# Patient Record
Sex: MALE | NOT HISPANIC OR LATINO | Employment: OTHER | URBAN - METROPOLITAN AREA
[De-identification: names, ages, dates, MRNs, and addresses within clinical notes are randomized per-mention and may not be internally consistent; named-entity substitution may affect disease eponyms.]

---

## 2018-12-03 ENCOUNTER — TELEPHONE (OUTPATIENT)
Dept: CARDIOLOGY CLINIC | Facility: CLINIC | Age: 38
End: 2018-12-03

## 2018-12-03 NOTE — TELEPHONE ENCOUNTER
[facility]; LM FOR PT TO CALL BACK  I SCANNED THE AUTH IN EPIC   LET ME KNOW WHAT DOCT PT IS GOING TO SEE SO I CAN GIVE ALL THE CLINICAL TO THE NURSE FOR THE DOCT TO REVIEW

## 2018-12-07 NOTE — TELEPHONE ENCOUNTER
LEFT 3RD MSG FOR PT TO CALL BACK    [facility]ED [facility] & LM ON ORTEGA WILLIAM'S VM LETTING HER KNOW THAT WE TRIED CALLING PT 3X FOR AN APPT & TO CALL US BACK IF SHE NEEDS ANYTHING ELSE FROM US

## 2018-12-27 ENCOUNTER — CONSULT (OUTPATIENT)
Dept: CARDIOLOGY CLINIC | Facility: CLINIC | Age: 38
End: 2018-12-27
Payer: OTHER GOVERNMENT

## 2018-12-27 VITALS
OXYGEN SATURATION: 98 % | HEIGHT: 71 IN | HEART RATE: 87 BPM | SYSTOLIC BLOOD PRESSURE: 128 MMHG | BODY MASS INDEX: 29.68 KG/M2 | WEIGHT: 212 LBS | DIASTOLIC BLOOD PRESSURE: 70 MMHG

## 2018-12-27 DIAGNOSIS — R40.20 LOSS OF CONSCIOUSNESS (HCC): Primary | ICD-10-CM

## 2018-12-27 DIAGNOSIS — R53.81 PHYSICAL DECONDITIONING: ICD-10-CM

## 2018-12-27 DIAGNOSIS — Z72.0 TOBACCO ABUSE: ICD-10-CM

## 2018-12-27 PROBLEM — G89.29 CHRONIC PAIN OF BOTH KNEES: Status: ACTIVE | Noted: 2018-12-27

## 2018-12-27 PROBLEM — M25.562 CHRONIC PAIN OF BOTH KNEES: Status: ACTIVE | Noted: 2018-12-27

## 2018-12-27 PROBLEM — F43.10 PTSD (POST-TRAUMATIC STRESS DISORDER): Status: ACTIVE | Noted: 2018-12-27

## 2018-12-27 PROBLEM — F32.A DEPRESSION: Status: ACTIVE | Noted: 2018-12-27

## 2018-12-27 PROBLEM — M25.561 CHRONIC PAIN OF BOTH KNEES: Status: ACTIVE | Noted: 2018-12-27

## 2018-12-27 PROBLEM — G89.29 CHRONIC LOW BACK PAIN: Status: ACTIVE | Noted: 2018-12-27

## 2018-12-27 PROBLEM — M54.50 CHRONIC LOW BACK PAIN: Status: ACTIVE | Noted: 2018-12-27

## 2018-12-27 PROCEDURE — 99205 OFFICE O/P NEW HI 60 MIN: CPT | Performed by: INTERNAL MEDICINE

## 2018-12-27 RX ORDER — PAROXETINE 30 MG/1
30 TABLET, FILM COATED ORAL EVERY MORNING
COMMUNITY

## 2018-12-27 RX ORDER — ROPINIROLE 0.25 MG/1
0.75 TABLET, FILM COATED ORAL 3 TIMES DAILY
COMMUNITY

## 2018-12-27 RX ORDER — HYDROCODONE BITARTRATE AND ACETAMINOPHEN 5; 325 MG/1; MG/1
1 TABLET ORAL EVERY 6 HOURS PRN
COMMUNITY

## 2018-12-27 NOTE — LETTER
December 27, 2018     Mitali Heredia MD  [address]    Patient: Darian Low   YOB: 1980   Date of Visit: 12/27/2018       Dear Dr Velazquez Bun: Thank you for referring Darian Low to me for evaluation  Below are my notes for this consultation  If you have questions, please do not hesitate to call me  I look forward to following your patient along with you  Sincerely,        Ra Cota DO        CC: No Recipients  Ra Cota DO  12/27/2018  1:19 PM  Sign at close encounter                                             Cardiology Consultation     Darian Low  [id number]  1980  [address] [facility]    Dear Dr Darshan Melgar is a 19-year-old male  who has been referred to the [address] [place] [address] Cardiology in [place] with the following issues:    1  Loss of consciousness  2  Tobacco abuse     Mr Soila Nunez was in his normal state of health while he was out walking his dogs 1 evening  When returning from this walk, he suddenly felt what he describes as "drunk "  He was forced to lean against a tree for balance, then dropped down to 1 knee  At that point he lost consciousness and does not remember anything  His girlfriend, who accompanies him on the visit today, witnessed the entire episode  After he lost consciousness, she rolled him over and he was hissing  It did appear that he stopped breathing she called 911 and began BLS  The patient had another episode of hissing at which point medics arrived  Throughout the entire ordeal, the patient was clenched with high muscle tone  No shaking or jerking movements were observed  He did not lose bowel or bladder control  He denies preceding palpitations or chest pain  He was evaluated in the [facility]  EEG was negative for seizure  Echocardiogram showed normal anatomy and normal function    Holter monitor showed episodes of sinus bradycardia with first-degree AV block, occasionally second-degree type 1 av block (Wenckebach) without evidence of high-grade heart block or malignant arrhythmia  It should be noted, that the patient's paroxetine dose had been increased by 10 mg on the day in question  This was reduced back down to 30 mg  Additionally, his process in was discontinued for concern of orthostatic hypotension  Since the original episode, he reports no further episodes of loss of consciousness  He continues to smoke tobacco, 1/2 pack per day  He is planning on quitting as a new year's resolution  In no palpitations, syncope, presyncope, chest pain, orthopnea or paroxysmal nocturnal dyspnea  He does make note of some dyspnea on exertion  He suffers from severe back pain, requiring ablation of the lumbar nerves as well as significant bilateral knee osteoarthritis  This limits his physical abilities  Past medical history includes:  1  Severe back pain, status post multiple lumbar nerve relations  2  Bilateral severe knee osteoarthritis  3  Posttraumatic stress disorder  4  Depression     Social History     Social History    Marital status: /Civil Union     Spouse name: N/A    Number of children: N/A    Years of education: N/A     Occupational History    Not on file  Social History Main Topics    Smoking status: Current Every Day Smoker    Smokeless tobacco: Current User    Alcohol use Not on file    Drug use: Unknown    Sexual activity: Not on file     Other Topics Concern    Not on file     Social History Narrative    No narrative on file      Family History   Problem Relation Age of Onset    Hypertension Mother     Diabetes Father      History reviewed  No pertinent surgical history      Current Outpatient Prescriptions:     fexofenadine (ALLEGRA) 30 MG tablet, Take 30 mg by mouth 2 (two) times a day, Disp: , Rfl:     HYDROcodone-acetaminophen (NORCO) 5-325 mg per tablet, Take 1 tablet by mouth every 6 (six) hours as needed for pain, Disp: , Rfl:     Melatonin 1 MG CAPS, Take 1 mg by mouth, Disp: , Rfl:     PARoxetine (PAXIL) 30 mg tablet, Take 30 mg by mouth every morning, Disp: , Rfl:     rOPINIRole (REQUIP) 0 25 mg tablet, Take 0 75 mg by mouth 3 (three) times a day, Disp: , Rfl:   No Known Allergies  Vitals:    12/27/18 1125   BP: 128/70   Pulse: 87   SpO2: 98%   Weight: 96 2 kg (212 lb)   Height: 5' 11" (1 803 m)       Labs:  Reviewed  No abnormalities noted  Imaging:  Echocardiogram, 10/03/2018        Event monitor, 11/16/2018      EEG, 10/03/2018        Review of Systems:  Review of Systems   Constitutional: Negative  Respiratory: Negative  Cardiovascular: Negative  Gastrointestinal: Negative  Endocrine: Negative  Musculoskeletal: Positive for arthralgias and back pain  Skin: Negative  Neurological:        Loss of consciousness, not otherwise specified   Hematological: Negative  Psychiatric/Behavioral: Positive for sleep disturbance  The patient is nervous/anxious  Physical Exam:  Physical Exam   Constitutional: He is oriented to person, place, and time  He appears well-developed and well-nourished  HENT:   Head: Normocephalic and atraumatic  Eyes: Conjunctivae and EOM are normal    Neck: No hepatojugular reflux and no JVD present  Carotid bruit is not present  No tracheal deviation present  No thyromegaly present  Cardiovascular: Normal rate, regular rhythm, S1 normal and S2 normal   PMI is not displaced  Exam reveals no gallop  No murmur heard  Pulses:       Carotid pulses are 2+ on the right side, and 2+ on the left side  Radial pulses are 2+ on the right side, and 2+ on the left side  Femoral pulses are 2+ on the right side, and 2+ on the left side  Dorsalis pedis pulses are 2+ on the right side, and 2+ on the left side          Posterior tibial pulses are 2+ on the right side, and 2+ on the left side    Pulmonary/Chest: Breath sounds normal  No accessory muscle usage  No tachypnea  No respiratory distress  Abdominal: Soft  Bowel sounds are normal  He exhibits no distension  There is no tenderness  Musculoskeletal: He exhibits no edema  Lymphadenopathy:        Head (right side): No submental, no submandibular, no tonsillar, no preauricular, no posterior auricular and no occipital adenopathy present  Head (left side): No submental, no submandibular, no tonsillar, no preauricular, no posterior auricular and no occipital adenopathy present  He has no cervical adenopathy  Neurological: He is alert and oriented to person, place, and time  Skin: Skin is warm and dry  Psychiatric: He has a normal mood and affect  His behavior is normal  Judgment and thought content normal        Discussion/Summary:  Loss of consciousness, favor seizure rather than syncope  Tobacco abuse  Deconditioning    Mr Sera Alfred likely suffered a seizure  The scenario he described does not portend a diagnosis of syncope  There were no preceding palpitations, there is no prodrome  When on the ground and unconscious, he maintained high tone with a clenched jaw and was confused after he woke up  Much of this points towards seizure with a postictal state  His cardiovascular testing is negative up to this point  I would continue workup from a neurologic standpoint  He was counseled on tobacco abuse in the benefits of quitting  He is planning on quitting in the new year  This versus deconditioning goes, I advised him to engage in exercise as he is able, given his back and knee pain  He will follow up in this office in 6 months for re-evaluation, sooner if any acute issues arise  Thank you for the opportunity to consult on this patient  If you have any questions, please feel free to call my office

## 2018-12-27 NOTE — PROGRESS NOTES
Cardiology Consultation     Lang Chauhan  [id number]  1980  [place]    Dear Dr Lomeli Child is a 42-year-old male  who has been referred to the [address] [place] [address] Cardiology in [place] with the following issues:    1  Loss of consciousness  2  Tobacco abuse     Mr Comfort Machado was in his normal state of health while he was out walking his dogs 1 evening  When returning from this walk, he suddenly felt what he describes as "drunk "  He was forced to lean against a tree for balance, then dropped down to 1 knee  At that point he lost consciousness and does not remember anything  His girlfriend, who accompanies him on the visit today, witnessed the entire episode  After he lost consciousness, she rolled him over and he was hissing  It did appear that he stopped breathing she called 911 and began BLS  The patient had another episode of hissing at which point medics arrived  Throughout the entire ordeal, the patient was clenched with high muscle tone  No shaking or jerking movements were observed  He did not lose bowel or bladder control  He denies preceding palpitations or chest pain  He was evaluated in the [place] system  EEG was negative for seizure  Echocardiogram showed normal anatomy and normal function  Holter monitor showed episodes of sinus bradycardia with first-degree AV block, occasionally second-degree type 1 av block (Wenckebach) without evidence of high-grade heart block or malignant arrhythmia  It should be noted, that the patient's paroxetine dose had been increased by 10 mg on the day in question  This was reduced back down to 30 mg  Additionally, his process in was discontinued for concern of orthostatic hypotension  Since the original episode, he reports no further episodes of loss of consciousness    He continues to smoke tobacco, 1/2 pack per day  He is planning on quitting as a new year's resolution  In no palpitations, syncope, presyncope, chest pain, orthopnea or paroxysmal nocturnal dyspnea  He does make note of some dyspnea on exertion  He suffers from severe back pain, requiring ablation of the lumbar nerves as well as significant bilateral knee osteoarthritis  This limits his physical abilities  Past medical history includes:  1  Severe back pain, status post multiple lumbar nerve relations  2  Bilateral severe knee osteoarthritis  3  Posttraumatic stress disorder  4  Depression     Social History     Social History    Marital status: /Civil Union     Spouse name: N/A    Number of children: N/A    Years of education: N/A     Occupational History    Not on file  Social History Main Topics    Smoking status: Current Every Day Smoker    Smokeless tobacco: Current User    Alcohol use Not on file    Drug use: Unknown    Sexual activity: Not on file     Other Topics Concern    Not on file     Social History Narrative    No narrative on file      Family History   Problem Relation Age of Onset    Hypertension Mother     Diabetes Father      History reviewed  No pertinent surgical history  Current Outpatient Prescriptions:     fexofenadine (ALLEGRA) 30 MG tablet, Take 30 mg by mouth 2 (two) times a day, Disp: , Rfl:     HYDROcodone-acetaminophen (NORCO) 5-325 mg per tablet, Take 1 tablet by mouth every 6 (six) hours as needed for pain, Disp: , Rfl:     Melatonin 1 MG CAPS, Take 1 mg by mouth, Disp: , Rfl:     PARoxetine (PAXIL) 30 mg tablet, Take 30 mg by mouth every morning, Disp: , Rfl:     rOPINIRole (REQUIP) 0 25 mg tablet, Take 0 75 mg by mouth 3 (three) times a day, Disp: , Rfl:   No Known Allergies  Vitals:    12/27/18 1125   BP: 128/70   Pulse: 87   SpO2: 98%   Weight: 96 2 kg (212 lb)   Height: 5' 11" (1 803 m)       Labs:  Reviewed    No abnormalities noted     Imaging:  Echocardiogram, 10/03/2018        Event monitor, 11/16/2018      EEG, 10/03/2018        Review of Systems:  Review of Systems   Constitutional: Negative  Respiratory: Negative  Cardiovascular: Negative  Gastrointestinal: Negative  Endocrine: Negative  Musculoskeletal: Positive for arthralgias and back pain  Skin: Negative  Neurological:        Loss of consciousness, not otherwise specified   Hematological: Negative  Psychiatric/Behavioral: Positive for sleep disturbance  The patient is nervous/anxious  Physical Exam:  Physical Exam   Constitutional: He is oriented to person, place, and time  He appears well-developed and well-nourished  HENT:   Head: Normocephalic and atraumatic  Eyes: Conjunctivae and EOM are normal    Neck: No hepatojugular reflux and no JVD present  Carotid bruit is not present  No tracheal deviation present  No thyromegaly present  Cardiovascular: Normal rate, regular rhythm, S1 normal and S2 normal   PMI is not displaced  Exam reveals no gallop  No murmur heard  Pulses:       Carotid pulses are 2+ on the right side, and 2+ on the left side  Radial pulses are 2+ on the right side, and 2+ on the left side  Femoral pulses are 2+ on the right side, and 2+ on the left side  Dorsalis pedis pulses are 2+ on the right side, and 2+ on the left side  Posterior tibial pulses are 2+ on the right side, and 2+ on the left side  Pulmonary/Chest: Breath sounds normal  No accessory muscle usage  No tachypnea  No respiratory distress  Abdominal: Soft  Bowel sounds are normal  He exhibits no distension  There is no tenderness  Musculoskeletal: He exhibits no edema  Lymphadenopathy:        Head (right side): No submental, no submandibular, no tonsillar, no preauricular, no posterior auricular and no occipital adenopathy present          Head (left side): No submental, no submandibular, no tonsillar, no preauricular, no posterior auricular and no occipital adenopathy present  He has no cervical adenopathy  Neurological: He is alert and oriented to person, place, and time  Skin: Skin is warm and dry  Psychiatric: He has a normal mood and affect  His behavior is normal  Judgment and thought content normal        Discussion/Summary:  Loss of consciousness, favor seizure rather than syncope  Tobacco abuse  Deconditioning    Mr Jairon Garduno likely suffered a seizure  The scenario he described does not portend a diagnosis of syncope  There were no preceding palpitations, there is no prodrome  When on the ground and unconscious, he maintained high tone with a clenched jaw and was confused after he woke up  Much of this points towards seizure with a postictal state  His cardiovascular testing is negative up to this point  I would continue workup from a neurologic standpoint  He was counseled on tobacco abuse in the benefits of quitting  He is planning on quitting in the new year  This versus deconditioning goes, I advised him to engage in exercise as he is able, given his back and knee pain  He will follow up in this office in 6 months for re-evaluation, sooner if any acute issues arise  Thank you for the opportunity to consult on this patient  If you have any questions, please feel free to call my office

## 2018-12-27 NOTE — LETTER
December 27, 2018     Rafi Suarez MD  [address]    Patient: Ab Orellana   YOB: 1980   Date of Visit: 12/27/2018       Dear Dr Hyun Garcia: Thank you for referring Ab Orellana to me for evaluation  Below are my notes for this consultation  If you have questions, please do not hesitate to call me  I look forward to following your patient along with you  Sincerely,        Alexa Vazquez DO        CC: No Recipients  Alexa Vazquez DO  12/27/2018  1:18 PM  Sign at close encounter                                             Cardiology Consultation     Ab Orellana  [id number]  1980  [address]    Dear Dr Augusto Pinto is a 72-year-old male  who has been referred to the [address] [place] [address] of Cardiology in [place] with the following issues:    1  Loss of consciousness  2  Tobacco abuse     Mr Jairon Garduno was in his normal state of health while he was out walking his dogs 1 evening  When returning from this walk, he suddenly felt what he describes as "drunk "  He was forced to lean against a tree for balance, then dropped down to 1 knee  At that point he lost consciousness and does not remember anything  His girlfriend, who accompanies him on the visit today, witnessed the entire episode  After he lost consciousness, she rolled him over and he was hissing  It did appear that he stopped breathing she called 911 and began BLS  The patient had another episode of hissing at which point medics arrived  Throughout the entire ordeal, the patient was clenched with high muscle tone  No shaking or jerking movements were observed  He did not lose bowel or bladder control  He denies preceding palpitations or chest pain  He was evaluated in the [place] system  EEG was negative for seizure  Echocardiogram showed normal anatomy and normal function    Holter monitor showed episodes of sinus bradycardia with first-degree AV block, occasionally second-degree type 1 av block (Wenckebach) without evidence of high-grade heart block or malignant arrhythmia  It should be noted, that the patient's paroxetine dose had been increased by 10 mg on the day in question  This was reduced back down to 30 mg  Additionally, his process in was discontinued for concern of orthostatic hypotension  Since the original episode, he reports no further episodes of loss of consciousness  He continues to smoke tobacco, 1/2 pack per day  He is planning on quitting as a new year's resolution  In no palpitations, syncope, presyncope, chest pain, orthopnea or paroxysmal nocturnal dyspnea  He does make note of some dyspnea on exertion  He suffers from severe back pain, requiring ablation of the lumbar nerves as well as significant bilateral knee osteoarthritis  This limits his physical abilities  Past medical history includes:  1  Severe back pain, status post multiple lumbar nerve relations  2  Bilateral severe knee osteoarthritis  3  Posttraumatic stress disorder  4  Depression     Social History     Social History    Marital status: /Civil Union     Spouse name: N/A    Number of children: N/A    Years of education: N/A     Occupational History    Not on file  Social History Main Topics    Smoking status: Current Every Day Smoker    Smokeless tobacco: Current User    Alcohol use Not on file    Drug use: Unknown    Sexual activity: Not on file     Other Topics Concern    Not on file     Social History Narrative    No narrative on file      Family History   Problem Relation Age of Onset    Hypertension Mother     Diabetes Father      History reviewed  No pertinent surgical history      Current Outpatient Prescriptions:     fexofenadine (ALLEGRA) 30 MG tablet, Take 30 mg by mouth 2 (two) times a day, Disp: , Rfl:     HYDROcodone-acetaminophen (NORCO) 5-325 mg per tablet, Take 1 tablet by mouth every 6 (six) hours as needed for pain, Disp: , Rfl:     Melatonin 1 MG CAPS, Take 1 mg by mouth, Disp: , Rfl:     PARoxetine (PAXIL) 30 mg tablet, Take 30 mg by mouth every morning, Disp: , Rfl:     rOPINIRole (REQUIP) 0 25 mg tablet, Take 0 75 mg by mouth 3 (three) times a day, Disp: , Rfl:   No Known Allergies  Vitals:    12/27/18 1125   BP: 128/70   Pulse: 87   SpO2: 98%   Weight: 96 2 kg (212 lb)   Height: 5' 11" (1 803 m)       Labs:{Recent [id number]::"OJU applicable"}  Imaging:  Echocardiogram, 10/03/2018        Event monitor, 11/16/2018      EEG, 10/03/2018        Review of Systems:  Review of Systems   Constitutional: Negative  Respiratory: Negative  Cardiovascular: Negative  Gastrointestinal: Negative  Endocrine: Negative  Musculoskeletal: Positive for arthralgias and back pain  Skin: Negative  Neurological:        Loss of consciousness, not otherwise specified   Hematological: Negative  Psychiatric/Behavioral: Positive for sleep disturbance  The patient is nervous/anxious  Physical Exam:  Physical Exam   Constitutional: He is oriented to person, place, and time  He appears well-developed and well-nourished  HENT:   Head: Normocephalic and atraumatic  Eyes: Conjunctivae and EOM are normal    Neck: No hepatojugular reflux and no JVD present  Carotid bruit is not present  No tracheal deviation present  No thyromegaly present  Cardiovascular: Normal rate, regular rhythm, S1 normal and S2 normal   PMI is not displaced  Exam reveals no gallop  No murmur heard  Pulses:       Carotid pulses are 2+ on the right side, and 2+ on the left side  Radial pulses are 2+ on the right side, and 2+ on the left side  Femoral pulses are 2+ on the right side, and 2+ on the left side  Dorsalis pedis pulses are 2+ on the right side, and 2+ on the left side          Posterior tibial pulses are 2+ on the right side, and 2+ on the left side    Pulmonary/Chest: Breath sounds normal  No accessory muscle usage  No tachypnea  No respiratory distress  Abdominal: Soft  Bowel sounds are normal  He exhibits no distension  There is no tenderness  Musculoskeletal: He exhibits no edema  Lymphadenopathy:        Head (right side): No submental, no submandibular, no tonsillar, no preauricular, no posterior auricular and no occipital adenopathy present  Head (left side): No submental, no submandibular, no tonsillar, no preauricular, no posterior auricular and no occipital adenopathy present  He has no cervical adenopathy  Neurological: He is alert and oriented to person, place, and time  Skin: Skin is warm and dry  Psychiatric: He has a normal mood and affect  His behavior is normal  Judgment and thought content normal        Discussion/Summary:  Loss of consciousness, favor seizure rather than syncope  Tobacco abuse  Deconditioning    Mr Reginald Locke likely suffered a seizure  The scenario he described does not portend a diagnosis of syncope  There were no preceding palpitations, there is no prodrome  When on the ground and unconscious, he maintained high tone with a clenched jaw and was confused after he woke up  Much of this points towards seizure with a postictal state  His cardiovascular testing is negative up to this point  I would continue workup from a neurologic standpoint  He was counseled on tobacco abuse in the benefits of quitting  He is planning on quitting in the new year  This versus deconditioning goes, I advised him to engage in exercise as he is able, given his back and knee pain  He will follow up in this office in 6 months for re-evaluation, sooner if any acute issues arise  Thank you for the opportunity to consult on this patient  If you have any questions, please feel free to call my office